# Patient Record
Sex: FEMALE | Race: BLACK OR AFRICAN AMERICAN
[De-identification: names, ages, dates, MRNs, and addresses within clinical notes are randomized per-mention and may not be internally consistent; named-entity substitution may affect disease eponyms.]

---

## 2018-07-03 ENCOUNTER — HOSPITAL ENCOUNTER (EMERGENCY)
Dept: HOSPITAL 62 - ER | Age: 28
LOS: 1 days | Discharge: HOME | End: 2018-07-04
Payer: COMMERCIAL

## 2018-07-03 DIAGNOSIS — O20.9: Primary | ICD-10-CM

## 2018-07-03 DIAGNOSIS — Z3A.01: ICD-10-CM

## 2018-07-03 DIAGNOSIS — R10.9: ICD-10-CM

## 2018-07-03 LAB
APPEARANCE UR: CLEAR
APTT PPP: YELLOW S
BILIRUB UR QL STRIP: NEGATIVE
GLUCOSE UR STRIP-MCNC: NEGATIVE MG/DL
KETONES UR STRIP-MCNC: NEGATIVE MG/DL
NITRITE UR QL STRIP: NEGATIVE
PH UR STRIP: 6 [PH] (ref 5–9)
PROT UR STRIP-MCNC: NEGATIVE MG/DL
SP GR UR STRIP: 1.01
UROBILINOGEN UR-MCNC: NEGATIVE MG/DL (ref ?–2)

## 2018-07-03 PROCEDURE — 36415 COLL VENOUS BLD VENIPUNCTURE: CPT

## 2018-07-03 PROCEDURE — 99284 EMERGENCY DEPT VISIT MOD MDM: CPT

## 2018-07-03 PROCEDURE — 81001 URINALYSIS AUTO W/SCOPE: CPT

## 2018-07-03 PROCEDURE — 84702 CHORIONIC GONADOTROPIN TEST: CPT

## 2018-07-03 PROCEDURE — 86900 BLOOD TYPING SEROLOGIC ABO: CPT

## 2018-07-03 PROCEDURE — 76817 TRANSVAGINAL US OBSTETRIC: CPT

## 2018-07-03 PROCEDURE — 85025 COMPLETE CBC W/AUTO DIFF WBC: CPT

## 2018-07-03 PROCEDURE — 93976 VASCULAR STUDY: CPT

## 2018-07-03 PROCEDURE — 86901 BLOOD TYPING SEROLOGIC RH(D): CPT

## 2018-07-03 NOTE — ER DOCUMENT REPORT
ED Medical Screen (RME)





- General


Chief Complaint: Vag Bleeding, +preg <12wks


Stated Complaint: VAGINAL BLEEDING


Mode of Arrival: Ambulatory


Information source: Patient


Notes: 


Patient is a 27 year old female currently 6 weeks pregnant presenting to the 

emergency department complaining of vaginal bleeding while pregnant onset 

today. Patient states she noticed light bleeding with abdominal cramps earlier 

today. Patient denies sexual intercourse today. Patients last menstrual cycle 

was May 24th, 2018. 





HEAD: Normocephalic, Atraumatic


CHEST: RRR


LUNGS: No respiratory distress. Clear auscultation bilaterally.


ABDOMEN: Soft, nontender.  





I have greeted and performed a rapid initial assessment of this patient.  A 

comprehensive ED assessment and evaluation of the patient, analysis of test 

results and completion of the medical decision making process will be conducted 

by additional ED providers.


TRAVEL OUTSIDE OF THE [place] IN LAST 30 DAYS: No





Physical Exam





- Vital signs


Vitals: 


 











Temp Pulse Resp BP Pulse Ox


 


 98.6 F   89   20   125/72   100 


 


 07/03/18 21:38  07/03/18 21:38  07/03/18 21:38  07/03/18 21:38  07/03/18 21:38














Course





- Vital Signs


Vital signs: 


 











Temp Pulse Resp BP Pulse Ox


 


 98.6 F   89   20   125/72   100 


 


 07/03/18 21:38  07/03/18 21:38  07/03/18 21:38  07/03/18 21:38  07/03/18 21:38














- Laboratory


Laboratory results interpreted by me: 


 











  07/03/18





  22:03


 


Urine Blood  MODERATE H

## 2018-07-04 VITALS — SYSTOLIC BLOOD PRESSURE: 115 MMHG | DIASTOLIC BLOOD PRESSURE: 68 MMHG

## 2018-07-04 LAB
ADD MANUAL DIFF: NO
BASOPHILS # BLD AUTO: 0.1 10^3/UL (ref 0–0.2)
BASOPHILS NFR BLD AUTO: 1 % (ref 0–2)
EOSINOPHIL # BLD AUTO: 0.1 10^3/UL (ref 0–0.6)
EOSINOPHIL NFR BLD AUTO: 1.4 % (ref 0–6)
ERYTHROCYTE [DISTWIDTH] IN BLOOD BY AUTOMATED COUNT: 14.7 % (ref 11.5–14)
HCT VFR BLD CALC: 35 % (ref 36–47)
HGB BLD-MCNC: 11.3 G/DL (ref 12–15.5)
LYMPHOCYTES # BLD AUTO: 2.5 10^3/UL (ref 0.5–4.7)
LYMPHOCYTES NFR BLD AUTO: 26.7 % (ref 13–45)
MCH RBC QN AUTO: 26.1 PG (ref 27–33.4)
MCHC RBC AUTO-ENTMCNC: 32.2 G/DL (ref 32–36)
MCV RBC AUTO: 81 FL (ref 80–97)
MONOCYTES # BLD AUTO: 0.6 10^3/UL (ref 0.1–1.4)
MONOCYTES NFR BLD AUTO: 6.8 % (ref 3–13)
NEUTROPHILS # BLD AUTO: 6.1 10^3/UL (ref 1.7–8.2)
NEUTS SEG NFR BLD AUTO: 64.1 % (ref 42–78)
PLATELET # BLD: 242 10^3/UL (ref 150–450)
RBC # BLD AUTO: 4.32 10^6/UL (ref 3.72–5.28)
TOTAL CELLS COUNTED % (AUTO): 100 %
WBC # BLD AUTO: 9.5 10^3/UL (ref 4–10.5)

## 2018-07-04 NOTE — ER DOCUMENT REPORT
ED GI/





- General


Chief Complaint: Vag Bleeding, +preg <12wks


Stated Complaint: VAGINAL BLEEDING


Time Seen by Provider: 18 23:23


Mode of Arrival: Ambulatory


Notes: 





Patient is a 27-year-old female,  (elective ) at about 6 weeks 

gestation by last menstrual period, that comes to the emergency department for 

chief complaint of vaginal bleeding and lower abdominal cramping.  Cramping is 

intermittent, bleeding with spotting, denies current symptoms other than vague 

cramps.  No flank pain, vomiting, fever, vaginal discharge, dysuria, or trauma.

  No sexual intercourse today.  No daily medications, no surgeries, no past 

medical history reported otherwise.


TRAVEL OUTSIDE OF THE [place] IN LAST 30 DAYS: No





Past Medical History





- General


Information source: Patient





- Social History


Smoking Status: Never Smoker


Frequency of alcohol use: None


Drug Abuse: None


Lives with: Family


Family History: Reviewed & Not Pertinent





- Medical History


Medical History: Negative


Surgical Hx: Negative





- Immunizations


Immunizations up to date: Yes


Hx Diphtheria, Pertussis, Tetanus Vaccination: Yes





Review of Systems





- Review of Systems


Constitutional: No symptoms reported


EENT: No symptoms reported


Cardiovascular: No symptoms reported


Respiratory: No symptoms reported


Gastrointestinal: See HPI


Genitourinary: See HPI


Female Genitourinary: See HPI


Musculoskeletal: No symptoms reported


Skin: No symptoms reported


Hematologic/Lymphatic: No symptoms reported


Neurological/Psychological: No symptoms reported





Physical Exam





- Vital signs


Vitals: 


 











Temp Pulse Resp BP Pulse Ox


 


 98.6 F   89   20   125/72   100 


 


 18 21:38  18 21:38  18 21:38  18 21:38  18 21:38














- Notes


Notes: 





GENERAL: Alert, interacts well. No acute distress.


HEAD: Normocephalic, atraumatic.


EYES: Pupils equal, round, and reactive to light. Extraocular movements intact.


ENT: Oral mucosa moist, tongue midline. 


NECK: Full range of motion. Supple. Trachea midline.


LUNGS: Clear to auscultation bilaterally, no wheezes, rales, or rhonchi. No 

respiratory distress.


HEART: Regular rate and rhythm. No murmur


ABDOMEN: Soft, non-tender. Non-distended. Bowel sounds present in all 4 

quadrants.


EXTREMITIES: Moves all 4 extremities spontaneously. No edema, normal radial and 

dorsalis pedis pulses bilaterally. No cyanosis.


BACK: no cervical, thoracic, lumbar midline tenderness. No saddle anesthesia, 

normal distal neurovascular exam. 


NEUROLOGICAL: Alert and oriented x3. Normal speech. [cranial nerves II through 

XII grossly intact]. 


PSYCH: Normal affect, normal mood.


SKIN: Warm, dry, normal turgor. No rashes or lesions noted.








Course





- Re-evaluation


Re-evalutation: 


Patient is alert and well-appearing.  Soft abdomen, nontender, unremarkable 

vital signs.  CBC unremarkable, urinalysis unremarkable, hCG as expected.  

RhoGam is not indicated.





Ultrasound showing subchorionic hemorrhage, fibroids, and 6 week intrauterine 

gestation with only heart flicker.  Threatened  per radiologist.  

Provided patient with a report, discussed report, workup, and recommendations 

in detail.  Patient already has a close follow-up referral placed with OB/GYN 

that her primary care provider set up, she states she will go to this, 

discussed return precautions in detail.  Patient and significant other state 

understanding and agreement.





- Vital Signs


Vital signs: 


 











Temp Pulse Resp BP Pulse Ox


 


 97.9 F   88   20   115/68   100 


 


 18 03:46  18 03:46  18 03:46  18 03:46  18 03:46














- Laboratory


Result Diagrams: 


 18 23:59





Laboratory results interpreted by me: 


 











  18





  22:03 23:59 23:59


 


Hgb   11.3 L 


 


Hct   35.0 L 


 


MCH   26.1 L 


 


RDW   14.7 H 


 


Beta HCG, Quant    17184.00 H


 


Urine Blood  MODERATE H  














Discharge





- Discharge


Clinical Impression: 


 Vaginal bleeding affecting early pregnancy, Abdominal cramping affecting 

pregnancy





Condition: Stable


Disposition: HOME, SELF-CARE


Additional Instructions: 


Your ultrasound shows a subchorionic bleed, this is probably the cause of your 

cramping and bleeding as discussed.  This should resolve with time, I recommend 

pelvic rest meaning no significant exertion including no running, jumping, 

heavy lifting, or sexual intercourse until cleared by OB/GYN.





Ultrasound shows a living intrauterine pregnancy, at risk with only fetal 

flicker noted when evaluating the heart.  Recommendation is to get a 72 hour 

follow-up ultrasound with OB/GYN referral as discussed.





Return for any concerning symptoms including severe pain, heavy bleeding, 

passing out, or any other concerning symptoms.


Referrals: 


RICHARDSON LEON MD [Primary Care Provider] - Follow up as needed

## 2018-07-04 NOTE — RADIOLOGY REPORT (SQ)
EXAM DESCRIPTION:

US PREGNANCY TRANSVAGINAL



COMPLETED DATE/TME:  07/04/2018 01:10



CLINICAL HISTORY:

27 years Female, 1st trimester bleeding and cramping



Comparison: None.



TECHNIQUE: Transvaginal.



LIMITATIONS: None.



FINDINGS:



Living intrauterine fetus measures  6  w0d with ROBERT of

02/27/2018.  Fetal flicker suggests presence of cardiac activity.

 Likely small subchorionic hematoma measures 1.3 x 0.7 x 0.8 cm.

Crown-rump length is 0.3-cm.



3.8-cm right ovary, 2.5-cm left ovary, 2.0 cm likely right corpus

luteum, 2.8-cm cervical length, and no free fluid. Fibroid uterus

includes a 2.9 cm anterior intramural fibroid, and 2.1 cm left

intramural fibroid.



IMPRESSION:



At-risk 1st trimester intrauterine gestation with "fetal flicker"

only identified at this time. 1.3 cm perigestational hemorrhage.

Fibroid uterus.  Consider 48-72 hr laboratory/sonographic

surveillance.